# Patient Record
Sex: FEMALE | Race: WHITE | NOT HISPANIC OR LATINO | URBAN - METROPOLITAN AREA
[De-identification: names, ages, dates, MRNs, and addresses within clinical notes are randomized per-mention and may not be internally consistent; named-entity substitution may affect disease eponyms.]

---

## 2017-09-20 ENCOUNTER — EMERGENCY (EMERGENCY)
Facility: HOSPITAL | Age: 47
LOS: 1 days | Discharge: HOME | End: 2017-09-20
Admitting: EMERGENCY MEDICINE
Payer: SELF-PAY

## 2017-09-20 VITALS
TEMPERATURE: 98 F | HEART RATE: 77 BPM | SYSTOLIC BLOOD PRESSURE: 118 MMHG | OXYGEN SATURATION: 97 % | RESPIRATION RATE: 17 BRPM | WEIGHT: 270.07 LBS | DIASTOLIC BLOOD PRESSURE: 82 MMHG

## 2017-09-20 VITALS
HEART RATE: 84 BPM | SYSTOLIC BLOOD PRESSURE: 124 MMHG | DIASTOLIC BLOOD PRESSURE: 86 MMHG | RESPIRATION RATE: 18 BRPM | OXYGEN SATURATION: 98 % | TEMPERATURE: 99 F
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
## 2017-09-20 DIAGNOSIS — M25.572 PAIN IN LEFT ANKLE AND JOINTS OF LEFT FOOT: ICD-10-CM

## 2017-09-20 DIAGNOSIS — S93.402A SPRAIN OF UNSPECIFIED LIGAMENT OF LEFT ANKLE, INITIAL ENCOUNTER: ICD-10-CM

## 2017-09-20 PROBLEM — Z00.00 ENCOUNTER FOR PREVENTIVE HEALTH EXAMINATION: Status: ACTIVE | Noted: 2017-09-20

## 2017-09-20 PROCEDURE — 99283 EMERGENCY DEPT VISIT LOW MDM: CPT | Mod: 25

## 2017-09-20 PROCEDURE — 73610 X-RAY EXAM OF ANKLE: CPT

## 2017-09-20 PROCEDURE — 29515 APPLICATION SHORT LEG SPLINT: CPT | Mod: LT

## 2017-09-20 PROCEDURE — 29515 APPLICATION SHORT LEG SPLINT: CPT

## 2017-09-20 PROCEDURE — 73610 X-RAY EXAM OF ANKLE: CPT | Mod: 26,LT

## 2017-09-20 RX ORDER — IBUPROFEN 200 MG
600 TABLET ORAL ONCE
Qty: 0 | Refills: 0 | Status: COMPLETED | OUTPATIENT
Start: 2017-09-20 | End: 2017-09-20

## 2017-09-20 RX ADMIN — Medication 600 MILLIGRAM(S): at 14:38

## 2017-09-20 NOTE — ED PROVIDER NOTE - MEDICAL DECISION MAKING DETAILS
43 yo female with h/o MS, in the ER with painful left ankle, s/p twisting on the street earlier today. pt ambulatory, but limping, neurovascular injured extremity intact, no obvious deformity on exam, less likey fx, pending xray. anticipate d/c home with out pt ortho f/u

## 2017-09-20 NOTE — ED ADULT TRIAGE NOTE - CHIEF COMPLAINT QUOTE
biba for left ankle injury / swelling.  Patient twisted her left ankle while walking yesterday.  Denies fall / head trauma / loc.  Hx: MS

## 2017-09-20 NOTE — ED PROVIDER NOTE - OBJECTIVE STATEMENT
41 yo female with h/o MS , accidentally twisted her left ankle  on the street today and almost fall.  pt reports that she felt like snap, and now she has pain with ambulation. Pt denies any numbness or tingling to her injured extremity. no other complaints

## 2017-09-20 NOTE — ED PROVIDER NOTE - MUSCULOSKELETAL, MLM
Spine appears normal, range of motion is not limited, left ankle, latero-dorsal tenderness, mild edema, no discolorations, normal dorsiflexion/plantar extension,

## 2017-09-20 NOTE — ED ADULT NURSE NOTE - CHPI ED SYMPTOMS NEG
no bruising/no abrasion/no difficulty bearing weight/no numbness/no deformity/no stiffness/no fever/no tingling/no back pain/no weakness

## 2017-09-28 ENCOUNTER — APPOINTMENT (OUTPATIENT)
Dept: ORTHOPEDIC SURGERY | Facility: CLINIC | Age: 47
End: 2017-09-28

## 2018-06-02 ENCOUNTER — EMERGENCY (EMERGENCY)
Facility: HOSPITAL | Age: 48
LOS: 1 days | Discharge: HOME | End: 2018-06-02
Admitting: EMERGENCY MEDICINE
Payer: SELF-PAY

## 2018-06-02 VITALS
HEART RATE: 74 BPM | OXYGEN SATURATION: 97 % | TEMPERATURE: 99 F | SYSTOLIC BLOOD PRESSURE: 117 MMHG | DIASTOLIC BLOOD PRESSURE: 77 MMHG | RESPIRATION RATE: 16 BRPM

## 2018-06-02 VITALS
TEMPERATURE: 98 F | OXYGEN SATURATION: 100 % | HEART RATE: 82 BPM | WEIGHT: 175.05 LBS | DIASTOLIC BLOOD PRESSURE: 78 MMHG | RESPIRATION RATE: 16 BRPM | SYSTOLIC BLOOD PRESSURE: 121 MMHG

## 2018-06-02 PROCEDURE — 73562 X-RAY EXAM OF KNEE 3: CPT

## 2018-06-02 PROCEDURE — 99284 EMERGENCY DEPT VISIT MOD MDM: CPT | Mod: 25

## 2018-06-02 PROCEDURE — 73700 CT LOWER EXTREMITY W/O DYE: CPT

## 2018-06-02 PROCEDURE — 73700 CT LOWER EXTREMITY W/O DYE: CPT | Mod: 26,RT

## 2018-06-02 PROCEDURE — 73562 X-RAY EXAM OF KNEE 3: CPT | Mod: 26,RT

## 2018-06-02 PROCEDURE — 99053 MED SERV 10PM-8AM 24 HR FAC: CPT

## 2018-06-02 RX ORDER — IBUPROFEN 200 MG
800 TABLET ORAL ONCE
Qty: 0 | Refills: 0 | Status: DISCONTINUED | OUTPATIENT
Start: 2018-06-02 | End: 2018-06-02

## 2018-06-02 RX ORDER — ACETAMINOPHEN 500 MG
975 TABLET ORAL ONCE
Qty: 0 | Refills: 0 | Status: COMPLETED | OUTPATIENT
Start: 2018-06-02 | End: 2018-06-02

## 2018-06-02 RX ADMIN — Medication 975 MILLIGRAM(S): at 04:15

## 2018-06-02 NOTE — ED ADULT TRIAGE NOTE - CHIEF COMPLAINT QUOTE
right knee is inflamed, I cant walk, have limited range of motion, cant straighten leg, lots of pain; denies injury or trauma

## 2018-06-02 NOTE — ED PROVIDER NOTE - OBJECTIVE STATEMENT
43 yo female, morbidly obese, with h/o MS, in the ER tonight c/o severe right knee pain. Pt states that pain started suddenly tonight. Denies any injury to her knee, denies discolorations, denies pain to her right calf. pt states any movement to her right knee is very painful and she was not able to ambulate due to pain.

## 2018-06-02 NOTE — ED ADULT NURSE NOTE - OBJECTIVE STATEMENT
pt c/o right knee pain since yesterday.  no trauma.  pt states pain is with movement or wt bear.  no pain at rest.

## 2018-06-02 NOTE — ED PROVIDER NOTE - MUSCULOSKELETAL, MLM
Spine appears normal, range of motion is not limited, no deformity to b/l knees, diffuse tenderness ro the right knee, over the patella , medial and posterior aspect. Pt refusing to flex her knee due to pain, no right calf tenderness, good distal pulses, no erythema or palor on exam, multiple varicose veins+

## 2018-06-02 NOTE — ED PROVIDER NOTE - MEDICAL DECISION MAKING DETAILS
43 yo female , morbidly obese, with h/o MS, in the Er due to severe right knee pain, difficulty ambulating. no clinical suspicion for septic joint ot DVT, pending Xray.

## 2018-06-06 DIAGNOSIS — M25.561 PAIN IN RIGHT KNEE: ICD-10-CM

## 2024-04-04 PROBLEM — G35 MULTIPLE SCLEROSIS: Chronic | Status: ACTIVE | Noted: 2018-06-02

## 2024-04-09 ENCOUNTER — APPOINTMENT (OUTPATIENT)
Dept: ORTHOPEDIC SURGERY | Facility: CLINIC | Age: 54
End: 2024-04-09